# Patient Record
(demographics unavailable — no encounter records)

---

## 2025-05-24 NOTE — HISTORY OF PRESENT ILLNESS
[FreeTextEntry1] : CC: c/o vag discharge, recurrent vulva itching. LMP: menopause HPI: As above. Reports h/o br lesion on R br on mammo , did not follow up.     POBGYN:  SAB X1  x 3   PAP:>8yrs ago Mammo:   PMHx: Obesity, Diabetes.

## 2025-05-24 NOTE — PHYSICAL EXAM
[MA] : MA [FreeTextEntry2] : Philippe [Appropriately responsive] : appropriately responsive [Alert] : alert [No Acute Distress] : no acute distress [No Lymphadenopathy] : no lymphadenopathy [Soft] : soft [Non-tender] : non-tender [Non-distended] : non-distended [No Lesions] : no lesions [Oriented x3] : oriented x3 [Examination Of The Breasts] : a normal appearance [No Masses] : no breast masses were palpable [Vulvitis] : vulvitis [Labia Majora] : normal [Labia Minora] : normal [Discharge] : a  ~M vaginal discharge was present [Normal] : normal [FreeTextEntry4] : Creamy curdy white d/c [FreeTextEntry6] : Limited exam due to large body habitus. ? R adx fullness.

## 2025-06-24 NOTE — DISCUSSION/SUMMARY
[de-identified] : I examined, evaluated, and discussed with the patient. The patient has low back symptoms for 10 years. She also has left leg numbness/pain. Claudication less than 2 blocks.  Based on physical exam and image findings, the patient diagnosis is moderate-severe degenerative disc disease through lumbar spine, worse at L4-5   Treatment plan is medications PRN and PT and lumbar MRI. Lumbar MRI is necessary because she failed conservative treatment such as pain meds and PT and for lumbar JEREMIE. Valium for MRI prescribed.   The patient understands everything and all questions were answered. The patient will return in 2-3 weeks.

## 2025-06-24 NOTE — HISTORY OF PRESENT ILLNESS
[de-identified] : The patient is 69 years old female who has low back pain for 10 years. The patient also has left leg pain/numbness. Claudication is less than 2 blocks. No clear cause of the symptoms.   Pain Level:  7-8 Duration of Symptoms:  10 years Symptom course:  Getting worse Accident:  No   Previous Treatment:    Pain meds:  Yes, Tylenol, Motrin    Physical therapy:  Yes    Epidural steroid injection:  No

## 2025-06-24 NOTE — REASON FOR VISIT
DISPLAY PLAN FREE TEXT [Initial Visit] : an initial visit for [Back Pain] : back pain [Radiculopathy] : radiculopathy Z Plasty Text: The lesion was extirpated to the level of the fat with a #15 scalpel blade.  Given the location of the defect, shape of the defect and the proximity to free margins a Z-plasty was deemed most appropriate for repair.  Using a sterile surgical marker, the appropriate transposition arms of the Z-plasty were drawn incorporating the defect and placing the expected incisions within the relaxed skin tension lines where possible.    The area thus outlined was incised deep to adipose tissue with a #15 scalpel blade.  The skin margins were undermined to an appropriate distance in all directions utilizing iris scissors.  The opposing transposition arms were then transposed into place in opposite direction and anchored with interrupted buried subcutaneous sutures.

## 2025-06-24 NOTE — PROCEDURE
[Endometrial Biopsy] : Endometrial biopsy [Thickened Endometrium] : thickened endometrium [Risks] : risks [N/A] : pregnancy test not applicable [No Premedication] : No premedication [None] : none [Tenaculum] : Tenaculum [Easy Passage] : Easy passage [Sounded to ___ cm] : sounded to [unfilled] ~Ucm [Scant] : scant [Specimen Collected] : collected [Sent to Pathology] : placed in buffered formalin and sent for pathology [Tolerated Well] : Patient tolerated the procedure well [No Complications] : No complications

## 2025-06-24 NOTE — CONSULT LETTER
[Dear  ___] : Dear  [unfilled], [Consult Letter:] : I had the pleasure of evaluating your patient, [unfilled]. [Please see my note below.] : Please see my note below. [Consult Closing:] : Thank you very much for allowing me to participate in the care of this patient.  If you have any questions, please do not hesitate to contact me. [Sincerely,] : Sincerely, [FreeTextEntry3] : Kellie Lazo MD PhD

## 2025-06-24 NOTE — PHYSICAL EXAM
[de-identified] : The patient is alert, cooperative, and oriented x 3. Pupils are equal and round. The patient does not have skin rash.   Obese. Gait is normal. Back ROM is limited with pain. Tenderness at PVM. SLR negative. DTR normal range. Motor and sensory are basically normal throughout bilateral L/E. Circulation of legs is normal. Bladder and bowel functions are normal.  [de-identified] : Lumbar spine x-ray taken today 4 views show moderate-severe degenerative disc disease through lumbar spine, worse at L4-5, no clear instability with flex/ext.

## 2025-07-11 NOTE — DISCUSSION/SUMMARY
[de-identified] : I examined, evaluated, and discussed with the patient. The patient has low back symptoms for 10 years. She also has left leg numbness/pain. Claudication less than 2 blocks. Follow-up of low back pain and MRI Her symptom is getting better. Today pain is 5/10. She did PT.  Based on physical exam and image findings, the patient diagnosis is disc bulging at L3-4, disc bulging and foraminal stenosis at L4-5 Rt, and foraminal stenosis L5-S1 Lt.  Treatment plan is medications PRN and PT and home exercise. She is not interested in further next step treatments at this point.   The patient understands everything and all questions were answered. The patient will return in 6-8 weeks.

## 2025-07-11 NOTE — HISTORY OF PRESENT ILLNESS
[de-identified] : Follow-up of low back pain and MRI Her symptom is getting better. Today pain is 5/10. She did PT.  Below is the history of initial visit. The patient is 69 years old female who has low back pain for 10 years. The patient also has left leg pain/numbness. Claudication is less than 2 blocks. No clear cause of the symptoms.   Pain Level:  7-8 Duration of Symptoms:  10 years Symptom course:  Getting worse Accident:  No   Previous Treatment:    Pain meds:  Yes, Tylenol, Motrin    Physical therapy:  Yes    Epidural steroid injection:  No

## 2025-07-11 NOTE — HISTORY OF PRESENT ILLNESS
[de-identified] : Follow-up of low back pain and MRI Her symptom is getting better. Today pain is 5/10. She did PT.  Below is the history of initial visit. The patient is 69 years old female who has low back pain for 10 years. The patient also has left leg pain/numbness. Claudication is less than 2 blocks. No clear cause of the symptoms.   Pain Level:  7-8 Duration of Symptoms:  10 years Symptom course:  Getting worse Accident:  No   Previous Treatment:    Pain meds:  Yes, Tylenol, Motrin    Physical therapy:  Yes    Epidural steroid injection:  No

## 2025-07-11 NOTE — PHYSICAL EXAM
[de-identified] : The patient is alert, cooperative, and oriented x 3. Pupils are equal and round. The patient does not have skin rash.   Obese. Gait is normal. Back ROM is limited with pain. Tenderness at PVM. SLR negative. DTR normal range. Motor and sensory are basically normal throughout bilateral L/E. Circulation of legs is normal. Bladder and bowel functions are normal.  [de-identified] : Lumbar MRI taken recently at [facility] shows disc bulging at L3-4, disc bulging and foraminal stenosis at L4-5 Rt, and foraminal stenosis L5-S1 Lt.

## 2025-07-11 NOTE — PHYSICAL EXAM
[de-identified] : The patient is alert, cooperative, and oriented x 3. Pupils are equal and round. The patient does not have skin rash.   Obese. Gait is normal. Back ROM is limited with pain. Tenderness at PVM. SLR negative. DTR normal range. Motor and sensory are basically normal throughout bilateral L/E. Circulation of legs is normal. Bladder and bowel functions are normal.  [de-identified] : Lumbar MRI taken recently at [facility] shows disc bulging at L3-4, disc bulging and foraminal stenosis at L4-5 Rt, and foraminal stenosis L5-S1 Lt.

## 2025-07-11 NOTE — DISCUSSION/SUMMARY
[de-identified] : I examined, evaluated, and discussed with the patient. The patient has low back symptoms for 10 years. She also has left leg numbness/pain. Claudication less than 2 blocks. Follow-up of low back pain and MRI Her symptom is getting better. Today pain is 5/10. She did PT.  Based on physical exam and image findings, the patient diagnosis is disc bulging at L3-4, disc bulging and foraminal stenosis at L4-5 Rt, and foraminal stenosis L5-S1 Lt.  Treatment plan is medications PRN and PT and home exercise. She is not interested in further next step treatments at this point.   The patient understands everything and all questions were answered. The patient will return in 6-8 weeks.